# Patient Record
(demographics unavailable — no encounter records)

---

## 2025-03-17 NOTE — HISTORY OF PRESENT ILLNESS
[de-identified] : 03/17/2025: right wrist pain recurred  11/21/2024: rhd 73 yo female here with right wrist pain x 2-3 mos.   PMH: htn , hyperlipidemia Allergies: NKDA

## 2025-03-17 NOTE — IMAGING
[de-identified] : Examination of the right hand is as follows:  Inspection: no ecchymosis, no erythema, no laceration/abrasion, no deformity, no nail deformity, no atrophy. Swelling of the radial side of the wrist just proximal to the radial styloid.  Tenderness over radial styloid and first dorsal compartment ROM: pain with range of motion, but good active flexion and extension of all finger joints.  Testing: positive Finkelstein's. Neuro: motor and sensory function intact in radial, ulnar, and median nerve distribution, no focal motor deficits, light touch intact throughout, palpable radial pulse, good capillary refill in all fingers.

## 2025-04-07 NOTE — HISTORY OF PRESENT ILLNESS
[de-identified] : DOS 3/29/25: Right Dequervain's release.  04/07/2025: Patient is here for PO#1 for her right wrist. States that she is doing much better with some minimal post surgical pain in her wrist.  03/17/2025: right wrist pain recurred  11/21/2024: rhd 75 yo female here with right wrist pain x 2-3 mos.   PMH: htn , hyperlipidemia Allergies: NKDA

## 2025-04-07 NOTE — IMAGING
She has stress urine incontinence based on history but does not desire any treatment at this time.  She is going to work on Kegel exercises and will let me know if she would like a referral to physical therapy.   [de-identified] : right wrist wound clean dry and intact no erythema no drainage FAROM NV unchanged sutures removed